# Patient Record
Sex: FEMALE | Race: WHITE | NOT HISPANIC OR LATINO
[De-identification: names, ages, dates, MRNs, and addresses within clinical notes are randomized per-mention and may not be internally consistent; named-entity substitution may affect disease eponyms.]

---

## 2017-02-13 PROBLEM — Z00.00 ENCOUNTER FOR PREVENTIVE HEALTH EXAMINATION: Status: ACTIVE | Noted: 2017-02-13

## 2017-05-31 ENCOUNTER — APPOINTMENT (OUTPATIENT)
Dept: UROLOGY | Facility: CLINIC | Age: 77
End: 2017-05-31

## 2017-06-22 ENCOUNTER — APPOINTMENT (OUTPATIENT)
Dept: UROLOGY | Facility: CLINIC | Age: 77
End: 2017-06-22
Payer: MEDICARE

## 2017-06-22 VITALS — WEIGHT: 136 LBS | BODY MASS INDEX: 25.68 KG/M2 | HEIGHT: 61 IN

## 2017-06-22 DIAGNOSIS — I10 ESSENTIAL (PRIMARY) HYPERTENSION: ICD-10-CM

## 2017-06-22 DIAGNOSIS — Z80.2 FAMILY HISTORY OF MALIGNANT NEOPLASM OF OTHER RESPIRATORY AND INTRATHORACIC ORGANS: ICD-10-CM

## 2017-06-22 DIAGNOSIS — Z87.19 PERSONAL HISTORY OF OTHER DISEASES OF THE DIGESTIVE SYSTEM: ICD-10-CM

## 2017-06-22 DIAGNOSIS — N39.3 STRESS INCONTINENCE (FEMALE) (MALE): ICD-10-CM

## 2017-06-22 DIAGNOSIS — Z63.4 DISAPPEARANCE AND DEATH OF FAMILY MEMBER: ICD-10-CM

## 2017-06-22 DIAGNOSIS — E03.9 HYPOTHYROIDISM, UNSPECIFIED: ICD-10-CM

## 2017-06-22 DIAGNOSIS — Z78.9 OTHER SPECIFIED HEALTH STATUS: ICD-10-CM

## 2017-06-22 PROCEDURE — 99204 OFFICE O/P NEW MOD 45 MIN: CPT

## 2017-06-22 RX ORDER — LISINOPRIL 30 MG/1
TABLET ORAL
Refills: 0 | Status: ACTIVE | COMMUNITY

## 2017-06-22 RX ORDER — LEVOTHYROXINE SODIUM 0.17 MG/1
TABLET ORAL
Refills: 0 | Status: ACTIVE | COMMUNITY

## 2017-06-22 SDOH — SOCIAL STABILITY - SOCIAL INSECURITY: DISSAPEARANCE AND DEATH OF FAMILY MEMBER: Z63.4

## 2017-08-11 ENCOUNTER — APPOINTMENT (OUTPATIENT)
Dept: UROLOGY | Facility: CLINIC | Age: 77
End: 2017-08-11
Payer: MEDICARE

## 2017-08-11 VITALS
BODY MASS INDEX: 25.68 KG/M2 | HEART RATE: 75 BPM | WEIGHT: 136 LBS | DIASTOLIC BLOOD PRESSURE: 61 MMHG | TEMPERATURE: 98.8 F | SYSTOLIC BLOOD PRESSURE: 112 MMHG | HEIGHT: 61 IN

## 2017-08-11 PROCEDURE — 99214 OFFICE O/P EST MOD 30 MIN: CPT

## 2017-08-11 RX ORDER — TERBINAFINE HYDROCHLORIDE 250 MG/1
250 TABLET ORAL
Qty: 30 | Refills: 0 | Status: ACTIVE | COMMUNITY
Start: 2017-06-01

## 2017-08-11 RX ORDER — CYCLOBENZAPRINE HYDROCHLORIDE 10 MG/1
10 TABLET, FILM COATED ORAL
Qty: 60 | Refills: 0 | Status: ACTIVE | COMMUNITY
Start: 2017-03-13

## 2017-08-11 RX ORDER — HYDROCHLOROTHIAZIDE 12.5 MG/1
12.5 TABLET ORAL
Qty: 90 | Refills: 0 | Status: ACTIVE | COMMUNITY
Start: 2017-06-08

## 2017-08-11 RX ORDER — AMOXICILLIN 500 MG/1
500 CAPSULE ORAL
Qty: 16 | Refills: 0 | Status: COMPLETED | COMMUNITY
Start: 2017-06-06

## 2017-08-11 RX ORDER — OXYBUTYNIN CHLORIDE 10 MG/1
10 TABLET, EXTENDED RELEASE ORAL DAILY
Qty: 90 | Refills: 2 | Status: ACTIVE | COMMUNITY
Start: 2017-06-22 | End: 1900-01-01

## 2018-02-13 ENCOUNTER — APPOINTMENT (OUTPATIENT)
Dept: UROLOGY | Facility: CLINIC | Age: 78
End: 2018-02-13

## 2022-12-05 ENCOUNTER — APPOINTMENT (OUTPATIENT)
Dept: UROGYNECOLOGY | Facility: CLINIC | Age: 82
End: 2022-12-05

## 2022-12-05 VITALS
SYSTOLIC BLOOD PRESSURE: 120 MMHG | WEIGHT: 136 LBS | BODY MASS INDEX: 25.68 KG/M2 | HEIGHT: 61 IN | DIASTOLIC BLOOD PRESSURE: 70 MMHG

## 2022-12-05 DIAGNOSIS — R15.1 FECAL SMEARING: ICD-10-CM

## 2022-12-05 PROCEDURE — 99204 OFFICE O/P NEW MOD 45 MIN: CPT

## 2022-12-05 NOTE — PHYSICAL EXAM
[Chaperone Present] : A chaperone was present in the examining room during all aspects of the physical examination [FreeTextEntry1] : General: Not in acute distress, alert and oriented x3.\par Neck: Supple. No lymphadenopathy. \par Abdomen: Soft, nontender, and nondistended. No obvious hepatosplenomegaly. No obvious hernias. \par Pelvic Exam: Normal external female genitalia. Saddle sensory exam S2 to S4 is intact. Perineal reflexes not visualized. Urethra is hypermobile without prolapse, exudates, or lesions. Cough stress test is negative with and without anterior vaginal wall reduction. Post void residual was checked with I/O cath and was 100 cc of clear urine. Pale and mildly atrophic-appearing vaginal epithelium. No vaginal blood or discharge. Cervix without abnormal lesions. Bimanual exam reveals a small uterus in normal positioning. No palpable adnexal masses or tenderness.  Rectovaginal exam: Significantly reduced resting and active anal sphincter tone.  No perineal pocket appreciated.  No enterocele or rectal prolapse appreciated.\par POPQ: Aa +0.05, Ba +0.05, C - 3, TVL 9, D-5, GH 4, PB 3.5, Ap -2.5, Bp -2.5.\par \par

## 2022-12-05 NOTE — ASSESSMENT
[FreeTextEntry1] : Patient is a 82years old multipara with stage II uterovaginal prolapse mainly involving the anterior vaginal wall; mixed urinary incontinence (urgency greater than stress), overactive bladder, nocturia, and insensible loss of feces. She has a normal postvoid residual and a negative cough stress test with and without prolapse reduction. \par \par

## 2022-12-05 NOTE — HISTORY OF PRESENT ILLNESS
[FreeTextEntry1] : Patient is a 82-year-old para 3 ( x2, forceps assisted vaginal delivery x1, who was referred by Dr. Robledo for evaluation and management of pelvic organ prolapse,  urinary and fecal incontinence.\par Patient reports " my uterus has dropped, i feel a ball. I urinate a lot and I can't hold urine". "Its affecting my bowel. I ooze. I will be shopping and suddenly feel wet. When I check, its soft soft on my pad. I don’t have a sensation to poop at the time"\par Patient has been aware of the prolapse for past 5 years, but it has gotten worse in the past 1 year. She started having urinary incontinence last year.\par She report sudden strong urge to void and can't hold it.  Also reports leaking with coughing sneezing\par She reports postvoid dribbling\par Denies sensation of    incomplete bladder emptying\par Reports nocturia, wakes up almost every 2 hour on most night\par Denies hx of frequent UTI or kidney stones\par Daily fluid intake: water, 1 cup of coffee, 1 cup of tea\par \par GYN history: Last menstrual period in her 40s.  She denies history of postmenopausal bleeding.  Reports last Pap smear was in .  She denies history of abnormal Pap smears or abnormal mammograms.\par \par Past medical history: Hypertension, hypothyroidism, hyperlipidemia\par \par Past surgical history: Carpal tunnel surgery, tubal ligation, total knee replacement, cataract surgery, hiatal hernia repair by Dr. Rios 3-4 years ago at [facility]

## 2022-12-05 NOTE — DISCUSSION/SUMMARY
[FreeTextEntry1] : The patient was counseled regarding the pathophysiology of the prolapse, urinary incontinence and fecal incontinence. A total of approximately 60 minutes was spent on this visit, greater than 50%of which was spent on counseling. She was also counseled regarding the risks, benefits, indications, and alternatives of further evaluations studies, as well as various management options. She was given verbal and written information/education on pelvic floor muscle exercises, avoidance of dietary bladder irritants, and other strategies to improve bladder control. She was counseled regarding treatment options for stress urinary incontinence including pelvic floor PT, pessary placement and surgical management with midurethral sling. Moncai interactive tool was used to explain normal anatomy as well as alteration in urethral support associated with stress urinary incontinence and prolapse. Midurethral sling placement as well as urethral bulking was discussed. Pharmacologic management of overactive bladder and urinary frequency was discussed. Treatment options for prolapse including pessary, vaginal and abdominal reconstructive procedure with and without mesh and obliterative procedure was reviewed. After a detailed discussion, following management plan was outlined:\par 1. Patient declines a trial of pessary.  She states that she has used diaphragm in the past and knows how it feels.  She is not interested in a pessary.\par 2.  She desires surgical management of prolapse.  We discussed pros and cons of vaginal versus abdominal reconstructive procedures.  She is interested in most effective procedure.  She is however concerned about her back she states that she has a bad back.  I discussed pros and cons of both approaches. She wants to think about both approached and wants to let me know on follow up visit. \par 3. Recommended urodynamic testing to evaluate for stress urinary incontinence \par 4. Recommended MRI to assess for rectal prolapse due to presence of fecal incontinence\par 5. F/u in 1 months.

## 2022-12-06 LAB
APPEARANCE: CLEAR
BACTERIA: NEGATIVE
BILIRUBIN URINE: NEGATIVE
BLOOD URINE: NEGATIVE
COLOR: YELLOW
GLUCOSE QUALITATIVE U: NEGATIVE
HYALINE CASTS: 0 /LPF
KETONES URINE: NEGATIVE
LEUKOCYTE ESTERASE URINE: NEGATIVE
MICROSCOPIC-UA: NORMAL
NITRITE URINE: NEGATIVE
PH URINE: 5.5
PROTEIN URINE: NEGATIVE
RED BLOOD CELLS URINE: 1 /HPF
SPECIFIC GRAVITY URINE: 1.02
SQUAMOUS EPITHELIAL CELLS: 0 /HPF
UROBILINOGEN URINE: NORMAL
WHITE BLOOD CELLS URINE: 0 /HPF

## 2022-12-07 LAB — BACTERIA UR CULT: NORMAL

## 2023-01-03 ENCOUNTER — APPOINTMENT (OUTPATIENT)
Dept: UROGYNECOLOGY | Facility: CLINIC | Age: 83
End: 2023-01-03
Payer: MEDICARE

## 2023-01-03 PROCEDURE — 51729 CYSTOMETROGRAM W/VP&UP: CPT

## 2023-01-03 PROCEDURE — 51784 ANAL/URINARY MUSCLE STUDY: CPT

## 2023-01-03 PROCEDURE — 51797 INTRAABDOMINAL PRESSURE TEST: CPT

## 2023-01-03 PROCEDURE — 51741 ELECTRO-UROFLOWMETRY FIRST: CPT

## 2023-01-06 ENCOUNTER — APPOINTMENT (OUTPATIENT)
Dept: UROGYNECOLOGY | Facility: CLINIC | Age: 83
End: 2023-01-06
Payer: MEDICARE

## 2023-01-06 VITALS
DIASTOLIC BLOOD PRESSURE: 70 MMHG | SYSTOLIC BLOOD PRESSURE: 120 MMHG | WEIGHT: 136 LBS | BODY MASS INDEX: 25.68 KG/M2 | HEIGHT: 61 IN

## 2023-01-06 DIAGNOSIS — N39.46 MIXED INCONTINENCE: ICD-10-CM

## 2023-01-06 PROCEDURE — 99214 OFFICE O/P EST MOD 30 MIN: CPT

## 2023-01-06 NOTE — HISTORY OF PRESENT ILLNESS
[FreeTextEntry1] : Patient is a 82years old multipara with stage II uterovaginal prolapse mainly involving the anterior vaginal wall; mixed urinary incontinence (urgency greater than stress), overactive bladder, nocturia, and insensible loss of feces. She has a normal postvoid residual and a negative cough stress test with and without prolapse reduction on previous office visit. She is interested in surgical management of prolapse. SHe presented for urodynamic testing on 1/3/2023. Her urodynamic test findings include a prolonged and interrupted uroflow with voided volume of 40 cc, postvoid residual of 300 cc, max flow of 3, average flow 5, and voiding time of 19 seconds; her complex cystogram was consistent with normal sensation, normal compliance, normal cystometric capacity of 451 cc, presence of a stress urinary incontinence with cough starting at filled volume of 300 cc and it was Valsalva starting at filled volume of 400 cc and absence of detrusor overactivity; her pressure voiding study showed voided volume of 470 cc with max flow rate of 50, peak pressure of 13, prolonged interrupted voiding pattern and Valsalva mechanism of voiding.\par

## 2023-01-06 NOTE — DISCUSSION/SUMMARY
[Visit Time ___ Minutes] : [unfilled] minutes [Face to Face Time___ Minutes] : with [unfilled] minutes in face to face consultation. [FreeTextEntry1] : Patient presents today for discussion of urodynamic test findings.  She also wants to review the surgical procedure.  She states that she did not review the information about the sacrocolpopexy versus vaginal hysterectomy that I provided to her on last visit.\par \par We again reviewed sacrocolpopexy vs vaginal reconstructive procedure. She desires procedure that offers highest efficacy however she is not comfortable with the use of mesh due to potential risks specific to the mesh use.  She reports undergoing hiatal hernia repair and cholecystectomy with Dr. Ugalde  3 to 4 years ago.  She is concerned about the potential presence of adhesions on bowel and risk of bowel injury etc.  After 45-minute discussion, patient decided to proceed with vaginal hysterectomy, BSO, uterosacral ligament suspension, anterior repair and possible posterior repair.  She states that she is 82-year-old and is not too worried about risk of recurrent prolapse.  She is feels that the abdominal procedure is more complex and she would like to keep things simple.\par \par I also discussed the urodynamic procedure finding in detail. She was again counseled regarding treatment options for stress urinary incontinence including pelvic floor PT, pessary placement and surgical management with midurethral sling. AUGS interactive tool was used to explain normal anatomy as well as alteration in urethral support associated with stress urinary incontinence. We also reviewed that sling procedure is only meant to address the leaking with activity/ cough etc. After a detailed discussion, patient wants to proceed with sling placement. I discussed the potential risks and complications associated with midurethral sling procedure. She verbalized understanding. \par \par I advised her to proceed with MRI pelvis to exclude rectal prolapse.  She understand that if she is noted to have rectal prolapse, that might need a change in surgical plan.\par \par All questions were answered to her satisfaction

## 2023-01-12 DIAGNOSIS — Z01.818 ENCOUNTER FOR OTHER PREPROCEDURAL EXAMINATION: ICD-10-CM

## 2023-03-13 ENCOUNTER — APPOINTMENT (OUTPATIENT)
Dept: UROGYNECOLOGY | Facility: CLINIC | Age: 83
End: 2023-03-13
Payer: MEDICARE

## 2023-03-13 ENCOUNTER — RESULT REVIEW (OUTPATIENT)
Age: 83
End: 2023-03-13

## 2023-03-13 PROCEDURE — 57283 COLPOPEXY INTRAPERITONEAL: CPT | Mod: XU

## 2023-03-13 PROCEDURE — 58260 VAGINAL HYSTERECTOMY: CPT

## 2023-03-13 PROCEDURE — 57240 ANTERIOR COLPORRHAPHY: CPT

## 2023-03-13 PROCEDURE — 57288 REPAIR BLADDER DEFECT: CPT | Mod: 53

## 2023-03-15 ENCOUNTER — NON-APPOINTMENT (OUTPATIENT)
Age: 83
End: 2023-03-15

## 2023-03-16 ENCOUNTER — NON-APPOINTMENT (OUTPATIENT)
Age: 83
End: 2023-03-16

## 2023-03-20 ENCOUNTER — APPOINTMENT (OUTPATIENT)
Dept: UROGYNECOLOGY | Facility: CLINIC | Age: 83
End: 2023-03-20
Payer: MEDICARE

## 2023-03-20 VITALS
HEIGHT: 61 IN | DIASTOLIC BLOOD PRESSURE: 60 MMHG | BODY MASS INDEX: 25.68 KG/M2 | SYSTOLIC BLOOD PRESSURE: 108 MMHG | WEIGHT: 136 LBS

## 2023-03-20 PROCEDURE — 99024 POSTOP FOLLOW-UP VISIT: CPT

## 2023-03-20 PROCEDURE — 51798 US URINE CAPACITY MEASURE: CPT

## 2023-03-30 NOTE — OBJECTIVE
[Voiding Trial] : Voiding trial was performed [Post Void Residual ____ ml] : Post Void Residual was [unfilled] ml [Soft and Nontender] : soft and nontender [Good Support] : Good support [Healing well] : healing well [FreeTextEntry3] : All vaginal compartments are well supported.  Vaginal incisions are healing well

## 2023-03-30 NOTE — SUBJECTIVE
[FreeTextEntry1] : Patient is a 82-year-old who underwent vaginal hysterectomy, uterosacral ligament suspension, anterior posterior repair and attempted mid urethral sling placement on 3/13/2023.  She was sent home with Gandara.  Patient presents today for removal of Gandara catheter and for postop check.  She would like to have the catheter removed but otherwise doing very well.  She reports resolution of constipation.  Reports a scant spotting.  The catheter has been draining clear urine.

## 2023-03-30 NOTE — ASSESSMENT
[FreeTextEntry1] : Patient making excellent postop recovery.\par Gandara catheter was discontinued.\par Patient was advised to void every 3-4 hours on a schedule.\par Continue bowel regimen.\par Follow-up in a month

## 2023-04-07 ENCOUNTER — APPOINTMENT (OUTPATIENT)
Dept: UROGYNECOLOGY | Facility: CLINIC | Age: 83
End: 2023-04-07
Payer: MEDICARE

## 2023-04-07 VITALS — DIASTOLIC BLOOD PRESSURE: 67 MMHG | SYSTOLIC BLOOD PRESSURE: 139 MMHG

## 2023-04-07 DIAGNOSIS — N81.2 INCOMPLETE UTEROVAGINAL PROLAPSE: ICD-10-CM

## 2023-04-07 DIAGNOSIS — Z98.890 OTHER SPECIFIED POSTPROCEDURAL STATES: ICD-10-CM

## 2023-04-07 DIAGNOSIS — R30.0 DYSURIA: ICD-10-CM

## 2023-04-07 PROCEDURE — 99213 OFFICE O/P EST LOW 20 MIN: CPT | Mod: 24

## 2023-04-07 RX ORDER — PHENAZOPYRIDINE HYDROCHLORIDE 200 MG/1
200 TABLET ORAL 3 TIMES DAILY
Qty: 15 | Refills: 3 | Status: ACTIVE | COMMUNITY
Start: 2023-04-07 | End: 1900-01-01

## 2023-04-07 RX ORDER — SOLIFENACIN SUCCINATE 5 MG/1
5 TABLET ORAL
Qty: 30 | Refills: 3 | Status: ACTIVE | COMMUNITY
Start: 2023-04-07 | End: 1900-01-01

## 2023-04-07 NOTE — HISTORY OF PRESENT ILLNESS
[FreeTextEntry1] :  Patient is a 82-year-old who underwent vaginal hysterectomy, uterosacral ligament suspension, anterior posterior repair and attempted mid urethral sling placement on 3/13/2023. She was sent home with Gandara which was removed on 3/20/2023.  Patient presents today for scheduled postop follow-up..  She reports some discomfort on the vulva right above the urethra.  She is not taking any analgesics.  She also reports frequent nighttime urination.  She feels pressure like she needs to defecate.  The urine frequency resolves once she has the bowel movement.  She is taking Metamucil and Colace twice a day.  She will denies leaking urine with coughing sneezing laughing.  Denies vaginal bleeding\par Final path report from 3/13/2023 was consistent with inactive endometrium and chronic cervicitis

## 2023-04-07 NOTE — PHYSICAL EXAM
[FreeTextEntry1] : General: Not in acute distress, alert and oriented x3.\par Abdomen: Soft, nontender, and nondistended. No obvious hepatosplenomegaly. No obvious hernias. \par Pelvic Exam: Normal external female genitalia.  Urethra is hypermobile without prolapse, exudates, or lesions. Cough stress test is negative. Post void residual was checked with I/O cath and was 15 cc of clear urine. Pale and mildly atrophic-appearing vaginal epithelium. No vaginal blood or discharge. Cervix and uterus surgically absent.  Vaginal apex is healing well.  Anterior and posterior vaginal wall incisions have also healed well.  All vaginal compartments are well supported.  No evidence of recurrent prolapse noted.

## 2023-04-07 NOTE — DISCUSSION/SUMMARY
[FreeTextEntry1] : Reassured patient that she is healing well.\par Also reminded her regarding intake of analgesics as needed, application of heating pad etc.\par Advised her to decrease the dose of Metamucil to once a day in the morning as she might be having abdominal cramping related to higher dose of Metamucil.\par Continue Colace twice a day\par Discussed use of  Pyridium as needed for burning sensation around urethra.  Also advised her not to use any soap for perineal washing\par Urine culture was sent from catheter specimen to exclude UTI\par She has history of nocturia.  She was taking OAB medications in the past.  I reviewed nighttime fluid restriction..  Prescribed low-dose Solifenacin.\par Follow-up in 2 to 3 weeks

## 2023-04-10 LAB
APPEARANCE: CLEAR
BACTERIA UR CULT: NORMAL
BACTERIA: NEGATIVE
BILIRUBIN URINE: NEGATIVE
BLOOD URINE: NEGATIVE
COLOR: NORMAL
GLUCOSE QUALITATIVE U: NEGATIVE
HYALINE CASTS: 7 /LPF
KETONES URINE: NEGATIVE
LEUKOCYTE ESTERASE URINE: ABNORMAL
MICROSCOPIC-UA: NORMAL
NITRITE URINE: NEGATIVE
PH URINE: 6.5
PROTEIN URINE: NEGATIVE
RED BLOOD CELLS URINE: 1 /HPF
SPECIFIC GRAVITY URINE: 1.01
SQUAMOUS EPITHELIAL CELLS: 0 /HPF
URINE COMMENTS: NORMAL
UROBILINOGEN URINE: NORMAL
WHITE BLOOD CELLS URINE: 94 /HPF

## 2023-04-24 ENCOUNTER — APPOINTMENT (OUTPATIENT)
Dept: UROGYNECOLOGY | Facility: CLINIC | Age: 83
End: 2023-04-24
Payer: MEDICARE

## 2023-04-24 VITALS — DIASTOLIC BLOOD PRESSURE: 71 MMHG | SYSTOLIC BLOOD PRESSURE: 135 MMHG

## 2023-04-24 PROCEDURE — 51701 INSERT BLADDER CATHETER: CPT | Mod: 59,79

## 2023-04-24 PROCEDURE — 99213 OFFICE O/P EST LOW 20 MIN: CPT | Mod: 24,25

## 2023-04-24 NOTE — DISCUSSION/SUMMARY
[FreeTextEntry1] : Reassured patient regarding normal healing.\par Discussed avoidance of vulvar irritants and use of organic coconut oil.  She may benefit from use of vaginal estrogen once the vaginal incisions are completely healed.  Also discussed use of skin barrier ointment prior to use of liners and pull ups\par Discontinue Pyridium.  She declined OAB medication.  A prescription for Solifenacin was sent to her pharmacy on last visit\par Her urine culture was negative on last visit.\par Follow-up in 3 months\par \par

## 2023-04-24 NOTE — PHYSICAL EXAM
[FreeTextEntry1] : General: Not in acute distress, alert and oriented x3.\par Abdomen: Soft, nontender, and nondistended. No obvious hepatosplenomegaly. No obvious hernias. \par Pelvic Exam: Normal external female genitalia.  Orange discoloration of the perineal skin.  Urethra is hypermobile without prolapse, exudates, or lesions. Cough stress test is negative. Post void residual was checked with I/O cath and was 25 cc of orange urine. Pale and mildly atrophic-appearing vaginal epithelium. No vaginal blood or discharge. Cervix and uterus surgically absent. Vaginal apex is healing well. Anterior and posterior vaginal wall incisions have also healed well. All vaginal compartments are well supported. No evidence of recurrent prolapse noted.

## 2023-04-24 NOTE — HISTORY OF PRESENT ILLNESS
[FreeTextEntry1] : ReportsPatient is a 82-year-old who underwent vaginal hysterectomy, uterosacral ligament suspension, anterior posterior repair and attempted mid urethral sling placement on 3/13/2023. She was sent home with Gandara which was removed on 3/20/2023. Patient presents today for scheduled postop follow-up.  Reports intermittent vulvar itching..  Denies vaginal bleeding or abnormal discharge.  States she is reporting daytime urine leakage requiring use of pull-up.  She is not using the OAB medication stating that she prefers to avoid medication.  She continues to take Pyridium because sitting it helps with vulvar burning and irritation.  She reports regular bowel movement with intake of Metamucil and Colace once a day\par Final path report from 3/13/2023 was consistent with inactive endometrium and chronic cervicitis.

## 2023-04-25 LAB
APPEARANCE: CLEAR
BACTERIA: NEGATIVE /HPF
BILIRUBIN URINE: NEGATIVE
BLOOD URINE: ABNORMAL
CAST: 1 /LPF
COLOR: YELLOW
EPITHELIAL CELLS: 0 /HPF
GLUCOSE QUALITATIVE U: NEGATIVE MG/DL
KETONES URINE: NEGATIVE MG/DL
LEUKOCYTE ESTERASE URINE: ABNORMAL
MICROSCOPIC-UA: NORMAL
NITRITE URINE: NEGATIVE
PH URINE: 6.5
PROTEIN URINE: NEGATIVE MG/DL
RED BLOOD CELLS URINE: 2 /HPF
SPECIFIC GRAVITY URINE: 1.01
UROBILINOGEN URINE: 0.2 MG/DL
WHITE BLOOD CELLS URINE: 30 /HPF

## 2023-04-26 LAB — BACTERIA UR CULT: NORMAL

## 2023-07-24 ENCOUNTER — APPOINTMENT (OUTPATIENT)
Dept: UROGYNECOLOGY | Facility: CLINIC | Age: 83
End: 2023-07-24
Payer: MEDICARE

## 2023-07-24 VITALS
BODY MASS INDEX: 25.68 KG/M2 | HEIGHT: 61 IN | SYSTOLIC BLOOD PRESSURE: 128 MMHG | WEIGHT: 136 LBS | DIASTOLIC BLOOD PRESSURE: 70 MMHG

## 2023-07-24 DIAGNOSIS — R10.2 PELVIC AND PERINEAL PAIN: ICD-10-CM

## 2023-07-24 PROCEDURE — 99214 OFFICE O/P EST MOD 30 MIN: CPT | Mod: 25

## 2023-07-24 PROCEDURE — 51701 INSERT BLADDER CATHETER: CPT | Mod: 59

## 2023-07-24 RX ORDER — ESTRADIOL 0.1 MG/G
0.1 CREAM VAGINAL
Qty: 1 | Refills: 3 | Status: ACTIVE | COMMUNITY
Start: 2023-07-24 | End: 1900-01-01

## 2023-07-24 NOTE — PHYSICAL EXAM
[Chaperone Present] : A chaperone was present in the examining room during all aspects of the physical examination [FreeTextEntry1] : General: Not in acute distress, alert and oriented x3.\par Abdomen: Soft, nontender, and nondistended. No obvious hepatosplenomegaly. No obvious hernias. \par Pelvic Exam: Normal external female genitalia. Urethra is hypermobile without prolapse, exudates, or lesions. Cough stress test is negative. Post void residual was checked with I/O cath and was 50 cc ofclear urine. Pale and mildly atrophic-appearing vaginal epithelium. No vaginal blood or discharge. Cervix and uterus surgically absent. Vaginal apex is healing well. Anterior and posterior vaginal wall incisions have also healed well. All vaginal compartments are well supported. No evidence of recurrent prolapse noted.  No tenderness noted along the vaginal apex or on palpation of anterior or posterior vaginal wall.  Atrophic changes noted.

## 2023-07-24 NOTE — HISTORY OF PRESENT ILLNESS
[FreeTextEntry1] : ReportsPatient is a 82-year-old who underwent vaginal hysterectomy, uterosacral ligament suspension, anterior posterior repair and attempted mid urethral sling placement on 3/13/2023. She was sent home with Gandara which was removed on 3/20/2023.  She was last seen on 4/23/2023.  She was having some urine leakage at that time but declined a trial of OAB medication.  Patient presents today for scheduled postop follow-up.  She in the pelvic area which relieves when she had bowel movements in the morning.  She is concerned she had adhesions between the bladder and her bowel 60 no longer have the uterus.  She denies burning with urination.  Her urine culture was negative from last visit..  She reports occasional urine urgency when she is shopping.  She wakes up 1-2 times per night.  She takes Colace every morning and reports daily bowel movements

## 2023-07-24 NOTE — DISCUSSION/SUMMARY
[FreeTextEntry1] : Patient was reassured regarding normal healing following the surgery.  Explained to her that it is difficult to exclude any intrapelvic scarring and adhesions even on imaging\par I recommended a trial of OAB medication due to presence of nocturia and urgency but she declined.\par I also advised a trial of vaginal estrogen for atrophic vaginitis.  She denies history of breast cancer.  Prescription for estradiol vaginal cream sent to her pharmacy.\par Urine culture ordered on cath specimen\par Follow-up in 3 to 4 months

## 2023-07-25 LAB
APPEARANCE: CLEAR
BACTERIA: NEGATIVE /HPF
BILIRUBIN URINE: NEGATIVE
BLOOD URINE: NEGATIVE
CAST: 0 /LPF
COLOR: YELLOW
EPITHELIAL CELLS: 2 /HPF
GLUCOSE QUALITATIVE U: NEGATIVE MG/DL
KETONES URINE: NEGATIVE MG/DL
LEUKOCYTE ESTERASE URINE: ABNORMAL
MICROSCOPIC-UA: NORMAL
NITRITE URINE: NEGATIVE
PH URINE: 6.5
PROTEIN URINE: NEGATIVE MG/DL
RED BLOOD CELLS URINE: 2 /HPF
SPECIFIC GRAVITY URINE: 1.02
UROBILINOGEN URINE: 0.2 MG/DL
WHITE BLOOD CELLS URINE: 19 /HPF

## 2023-07-28 DIAGNOSIS — N39.0 URINARY TRACT INFECTION, SITE NOT SPECIFIED: ICD-10-CM

## 2023-07-28 LAB — BACTERIA UR CULT: ABNORMAL

## 2023-07-28 RX ORDER — CEPHALEXIN 500 MG/1
500 CAPSULE ORAL TWICE DAILY
Qty: 14 | Refills: 0 | Status: ACTIVE | COMMUNITY
Start: 2023-07-28 | End: 1900-01-01

## 2023-11-20 ENCOUNTER — APPOINTMENT (OUTPATIENT)
Dept: UROGYNECOLOGY | Facility: CLINIC | Age: 83
End: 2023-11-20
Payer: MEDICARE

## 2023-11-20 VITALS
SYSTOLIC BLOOD PRESSURE: 122 MMHG | BODY MASS INDEX: 25.68 KG/M2 | DIASTOLIC BLOOD PRESSURE: 68 MMHG | HEIGHT: 61 IN | WEIGHT: 136 LBS

## 2023-11-20 DIAGNOSIS — R35.0 FREQUENCY OF MICTURITION: ICD-10-CM

## 2023-11-20 DIAGNOSIS — N32.81 OVERACTIVE BLADDER: ICD-10-CM

## 2023-11-20 DIAGNOSIS — R35.1 NOCTURIA: ICD-10-CM

## 2023-11-20 DIAGNOSIS — R32 UNSPECIFIED URINARY INCONTINENCE: ICD-10-CM

## 2023-11-20 DIAGNOSIS — N95.2 POSTMENOPAUSAL ATROPHIC VAGINITIS: ICD-10-CM

## 2023-11-20 DIAGNOSIS — R30.0 DYSURIA: ICD-10-CM

## 2023-11-20 DIAGNOSIS — N39.41 URGE INCONTINENCE: ICD-10-CM

## 2023-11-20 PROCEDURE — 99214 OFFICE O/P EST MOD 30 MIN: CPT

## 2023-11-20 PROCEDURE — 81003 URINALYSIS AUTO W/O SCOPE: CPT | Mod: QW

## 2023-11-20 RX ORDER — SOLIFENACIN SUCCINATE 5 MG/1
5 TABLET ORAL
Qty: 30 | Refills: 2 | Status: ACTIVE | COMMUNITY
Start: 2023-11-20 | End: 1900-01-01

## 2023-11-21 LAB
APPEARANCE: CLEAR
BACTERIA: NEGATIVE /HPF
BILIRUBIN URINE: NEGATIVE
BLOOD URINE: NEGATIVE
CAST: 2 /LPF
COLOR: YELLOW
EPITHELIAL CELLS: 22 /HPF
GLUCOSE QUALITATIVE U: NEGATIVE MG/DL
KETONES URINE: NEGATIVE MG/DL
LEUKOCYTE ESTERASE URINE: ABNORMAL
MICROSCOPIC-UA: NORMAL
NITRITE URINE: NEGATIVE
PH URINE: 6.5
PROTEIN URINE: NEGATIVE MG/DL
RED BLOOD CELLS URINE: 1 /HPF
SPECIFIC GRAVITY URINE: 1.02
UROBILINOGEN URINE: 0.2 MG/DL
WHITE BLOOD CELLS URINE: 13 /HPF

## 2023-11-22 LAB — BACTERIA UR CULT: NORMAL

## 2023-12-18 ENCOUNTER — APPOINTMENT (OUTPATIENT)
Dept: UROGYNECOLOGY | Facility: CLINIC | Age: 83
End: 2023-12-18

## 2024-01-05 NOTE — ASSESSMENT
[FreeTextEntry1] : Patient is a 82-year-old who underwent vaginal hysterectomy, uterosacral ligament suspension, anterior posterior repair and attempted mid urethral sling placement on 3/13/2023. She was sent home with Gandara which was removed on 3/20/2023. Pt reports urinary frequency and urgency since surgery and mild pain. On exam, well healed vaginal walls.

## 2024-01-05 NOTE — PHYSICAL EXAM
[FreeTextEntry1] : General: Not in acute distress, alert and oriented x3. Abdomen: Soft, nontender, and nondistended. No obvious hepatosplenomegaly. No obvious hernias.  Pelvic Exam: Normal external female genitalia. Pale and mildly atrophic-appearing vaginal epithelium. No vaginal blood or discharge. Cervix and uterus surgically absent. Vaginal apex is healing well. Anterior and posterior vaginal wall incisions have also healed well. All vaginal compartments are well supported. No evidence of recurrent prolapse noted. No tenderness noted along the vaginal apex or on palpation of anterior or posterior vaginal wall. Atrophic changes noted.

## 2024-01-05 NOTE — ADDENDUM
[FreeTextEntry1] : I, Dr. Elina Meyers, was physically present in the office during the service provided to this patient.  I reviewed the history of presenting illness, chief complaint, management plan with physician assistant Heidi Carreon.  I agree with her assessment and plan as documented by the PA

## 2024-01-05 NOTE — DISCUSSION/SUMMARY
[FreeTextEntry1] : Patient was reassured regarding normal healing following the surgery.   [] I recommended a trial of OAB medication due to presence of nocturia and urgency, pt agreed to take Solifenacin 5mg p.o. daily.   discussed side effects. eRx sent to pharmacy. [] Advised her to continue taking Estradiol 0.1 mg/gm 1 g per vagina at bedtime biweekly.  [] Sterile cups and script for UA and culture given to drop off urine after done with antibiotics.  If there is some urinary tract infection will treat with antibiotics such as Keflex 500 mg p.o. twice daily for 7 days [] Follow up in one month for medication. [] Instructed to call with any questions or concerns and she verbalizes understanding.

## 2024-01-05 NOTE — HISTORY OF PRESENT ILLNESS
[FreeTextEntry1] : Patient is a 83-year-old who underwent vaginal hysterectomy, uterosacral ligament suspension, anterior posterior repair and attempted mid urethral sling placement on 3/13/2023. She was sent home with Gandara which was removed on 3/20/2023. She was last seen on 7/24/2023. She was having some urine leakage and nocturia at that time but declined a trial of OAB medication.  Her urine culture from 7/24/2023 returned positive for 50-99,000 CFU per mL of E. coli.  She was treated with Keflex.  Pt states she was admitted in the hospital for four days for diverticulitis and was treated with antibiotics. Reports using vaginal estrogen with good results. Pt states she feels this urge to urinate all the time and have constant pain in her bladder area. Reports voiding every hour during the day and night and leakage accidents when unable to make to the bathroom on time. Reports wearing pads all the time. Pt reports she had URI last week and is taking Amoxicillin rt now. Pt reports she takes two HTN medications HCTZ and lisinopril however notes BP in 155 systolic recently.